# Patient Record
Sex: FEMALE | Race: WHITE | URBAN - METROPOLITAN AREA
[De-identification: names, ages, dates, MRNs, and addresses within clinical notes are randomized per-mention and may not be internally consistent; named-entity substitution may affect disease eponyms.]

---

## 2019-06-19 ENCOUNTER — Encounter (OUTPATIENT)
Dept: URBAN - METROPOLITAN AREA CLINIC 71 | Facility: CLINIC | Age: 79
End: 2019-06-19
Payer: MEDICARE

## 2019-06-19 PROCEDURE — 99214 OFFICE O/P EST MOD 30 MIN: CPT | Performed by: OPHTHALMOLOGY

## 2019-06-19 PROCEDURE — 92250 FUNDUS PHOTOGRAPHY W/I&R: CPT | Performed by: OPHTHALMOLOGY

## 2019-11-19 ENCOUNTER — Encounter (OUTPATIENT)
Dept: URBAN - METROPOLITAN AREA CLINIC 71 | Facility: CLINIC | Age: 79
End: 2019-11-19
Payer: MEDICARE

## 2019-11-19 PROCEDURE — 92014 COMPRE OPH EXAM EST PT 1/>: CPT | Performed by: OPTOMETRIST

## 2019-11-19 PROCEDURE — 92134 CPTRZ OPH DX IMG PST SGM RTA: CPT | Performed by: OPTOMETRIST

## 2020-11-25 ENCOUNTER — OFFICE VISIT (OUTPATIENT)
Dept: URBAN - METROPOLITAN AREA CLINIC 71 | Facility: CLINIC | Age: 80
End: 2020-11-25
Payer: MEDICARE

## 2020-11-25 PROCEDURE — 92014 COMPRE OPH EXAM EST PT 1/>: CPT | Performed by: OPHTHALMOLOGY

## 2020-11-25 ASSESSMENT — INTRAOCULAR PRESSURE
OS: 16
OD: 15

## 2020-11-25 ASSESSMENT — KERATOMETRY
OS: 43.88
OD: 44.13

## 2020-11-25 NOTE — IMPRESSION/PLAN
Impression: Combined forms of age-related cataract, bilateral: H25.813. Plan: Discussed diagnosis in detail with patient. Discussed treatment options with patient. Discussed risks and benefits and patient understands. Discussed this being the time to get them removed- pt would like to wait till covid is over.

## 2021-05-03 ENCOUNTER — OFFICE VISIT (OUTPATIENT)
Dept: URBAN - METROPOLITAN AREA CLINIC 71 | Facility: CLINIC | Age: 81
End: 2021-05-03
Payer: MEDICARE

## 2021-05-03 PROCEDURE — 99213 OFFICE O/P EST LOW 20 MIN: CPT | Performed by: OPHTHALMOLOGY

## 2021-05-03 ASSESSMENT — INTRAOCULAR PRESSURE
OD: 14
OS: 16

## 2021-06-08 ENCOUNTER — OFFICE VISIT (OUTPATIENT)
Dept: URBAN - METROPOLITAN AREA CLINIC 71 | Facility: CLINIC | Age: 81
End: 2021-06-08
Payer: MEDICARE

## 2021-06-08 DIAGNOSIS — H11.31 CONJUNCTIVAL HEMORRHAGE, RIGHT EYE: Primary | ICD-10-CM

## 2021-06-08 DIAGNOSIS — H25.813 COMBINED FORMS OF AGE-RELATED CATARACT, BILATERAL: ICD-10-CM

## 2021-06-08 PROCEDURE — 99212 OFFICE O/P EST SF 10 MIN: CPT | Performed by: OPHTHALMOLOGY

## 2021-06-08 ASSESSMENT — INTRAOCULAR PRESSURE
OD: 17
OS: 17

## 2021-06-08 NOTE — IMPRESSION/PLAN
Impression: Conjunctival hemorrhage, right eye: H11.31. Plan: Discussed DX with patient, explained that this will resolve its self.

## 2021-06-08 NOTE — IMPRESSION/PLAN
Impression: Combined forms of age-related cataract, bilateral: H25.813. Plan: Will re-evaluate in 6 mons.

## 2021-08-02 ENCOUNTER — OFFICE VISIT (OUTPATIENT)
Dept: URBAN - METROPOLITAN AREA CLINIC 71 | Facility: CLINIC | Age: 81
End: 2021-08-02
Payer: MEDICARE

## 2021-08-02 DIAGNOSIS — H43.813 VITREOUS DEGENERATION, BILATERAL: ICD-10-CM

## 2021-08-02 DIAGNOSIS — H04.123 DRY EYE SYNDROME OF BILATERAL LACRIMAL GLANDS: ICD-10-CM

## 2021-08-02 PROCEDURE — 92014 COMPRE OPH EXAM EST PT 1/>: CPT | Performed by: OPHTHALMOLOGY

## 2021-08-02 PROCEDURE — 92134 CPTRZ OPH DX IMG PST SGM RTA: CPT | Performed by: OPHTHALMOLOGY

## 2021-08-02 ASSESSMENT — INTRAOCULAR PRESSURE
OD: 15
OS: 15

## 2021-08-02 ASSESSMENT — KERATOMETRY
OD: 44.00
OS: 43.75

## 2021-08-02 ASSESSMENT — VISUAL ACUITY
OD: 20/20
OS: 20/20

## 2021-08-02 NOTE — IMPRESSION/PLAN
Impression: Combined forms of age-related cataract, bilateral: H25.813. Plan: Cataracts account for the patient's complaints. Discussed all risks, benefits, procedures and recovery. Patient understands changing glasses will not improve vision. Patient desires to have surgery, order for surgery.  

OS first

## 2022-01-24 ENCOUNTER — PRE-OPERATIVE VISIT (OUTPATIENT)
Dept: URBAN - METROPOLITAN AREA CLINIC 71 | Facility: CLINIC | Age: 82
End: 2022-01-24
Payer: MEDICARE

## 2022-01-24 DIAGNOSIS — H25.812 COMBINED FORMS OF AGE-RELATED CATARACT, LEFT EYE: ICD-10-CM

## 2022-01-24 PROCEDURE — 92012 INTRM OPH EXAM EST PATIENT: CPT | Performed by: OPHTHALMOLOGY

## 2022-01-24 PROCEDURE — 92136 OPHTHALMIC BIOMETRY: CPT | Performed by: OPHTHALMOLOGY

## 2022-01-24 RX ORDER — KETOROLAC TROMETHAMINE 5 MG/ML
0.5 % SOLUTION OPHTHALMIC
Qty: 5 | Refills: 3 | Status: ACTIVE
Start: 2022-01-24

## 2022-01-24 ASSESSMENT — PACHYMETRY
OD: 3.41
OS: 3.63
OD: 25.31
OS: 25.40

## 2022-01-24 ASSESSMENT — VISUAL ACUITY
OS: 20/20
OD: 20/20

## 2022-01-27 NOTE — IMPRESSION/PLAN
Impression: Combined forms of age-related cataract, bilateral: H25.813. Plan:  Discussed cataract diagnosis with the patient. Discussed and reviewed treatment options for cataracts. Surgical treatment is necessary to improve vision. Risks and benefits of surgical treatment were discussed and understood. Patient elects surgical treatment. Recommend surgery OU, OD first. Patient is candidate/interested in multifocal lens, toric lens/astigmatism correction, LenSx, ORA, Aim OD: -0.25. Aim OS: -0.25. Patient will need glasses for all near work, including computer. Outcome of surgery limitations: Residual Astigmatism. PROVIDER ORDERED [facility], [address] [id number] [address]. PROCEED WITH FOREVER YOUNG CATARACT SURGERY WITH ORA, OD FIRST, THEN OS, CLEARANCE FROM ONCOLOGIST OBTAINED, DEXYCU, KETOROLAC, CS DONE.

## 2022-02-01 ENCOUNTER — SURGERY (OUTPATIENT)
Dept: URBAN - METROPOLITAN AREA SURGERY 44 | Facility: SURGERY | Age: 82
End: 2022-02-01
Payer: MEDICARE

## 2022-02-01 ENCOUNTER — SURGERY (OUTPATIENT)
Dept: URBAN - METROPOLITAN AREA SURGERY 45 | Facility: SURGERY | Age: 82
End: 2022-02-01
Payer: MEDICARE

## 2022-02-01 PROCEDURE — PR1FY PR1FY: CUSTOM | Performed by: OPHTHALMOLOGY

## 2022-02-01 PROCEDURE — 66984 XCAPSL CTRC RMVL W/O ECP: CPT | Performed by: OPHTHALMOLOGY

## 2022-02-02 ENCOUNTER — POST-OPERATIVE VISIT (OUTPATIENT)
Dept: URBAN - METROPOLITAN AREA CLINIC 71 | Facility: CLINIC | Age: 82
End: 2022-02-02
Payer: MEDICARE

## 2022-02-02 DIAGNOSIS — Z48.810 ENCOUNTER FOR SURGICAL AFTERCARE FOLLOWING SURGERY ON A SENSE ORGAN: Primary | ICD-10-CM

## 2022-02-02 PROCEDURE — 99024 POSTOP FOLLOW-UP VISIT: CPT | Performed by: OPHTHALMOLOGY

## 2022-02-02 ASSESSMENT — INTRAOCULAR PRESSURE
OD: 6
OS: 9

## 2022-02-02 NOTE — IMPRESSION/PLAN
Impression: S/P Cataract Extraction by phacoemulsification with IOL placement; Femto (LenSx); LRI (Limbal Relaxing Incision); ORA OD - 1 Day. Wound secure. IOL well centered. IOP is stable. Plan: Patient is cleared to proceed as scheduled for second eye cataract surgery.

## 2022-04-26 ENCOUNTER — SURGERY (OUTPATIENT)
Dept: URBAN - METROPOLITAN AREA SURGERY 45 | Facility: SURGERY | Age: 82
End: 2022-04-26
Payer: MEDICARE

## 2022-04-26 ENCOUNTER — SURGERY (OUTPATIENT)
Dept: URBAN - METROPOLITAN AREA SURGERY 44 | Facility: SURGERY | Age: 82
End: 2022-04-26
Payer: MEDICARE

## 2022-04-26 PROCEDURE — 66984 XCAPSL CTRC RMVL W/O ECP: CPT | Performed by: OPHTHALMOLOGY

## 2022-04-26 PROCEDURE — PR1FY PR1FY: CUSTOM | Performed by: OPHTHALMOLOGY

## 2022-04-27 ENCOUNTER — POST-OPERATIVE VISIT (OUTPATIENT)
Dept: URBAN - METROPOLITAN AREA CLINIC 71 | Facility: CLINIC | Age: 82
End: 2022-04-27
Payer: MEDICARE

## 2022-04-27 PROCEDURE — 99024 POSTOP FOLLOW-UP VISIT: CPT | Performed by: OPHTHALMOLOGY

## 2022-04-27 ASSESSMENT — INTRAOCULAR PRESSURE
OD: 10
OS: 21

## 2022-04-27 NOTE — IMPRESSION/PLAN
Impression: S/P Cataract Extraction by phacoemulsification with IOL placement; ORA; Femto (LenSx); LRI (Limbal Relaxing Incision) OS - 1 Day. Presence of intraocular lens  Z96.1. Plan: IOL in place, helaing well. Continue ketorolac BID OS for 3 weeks. 
Will have pt f/u with an optom for the PO REF

## 2022-05-25 ENCOUNTER — POST-OPERATIVE VISIT (OUTPATIENT)
Dept: URBAN - METROPOLITAN AREA CLINIC 71 | Facility: CLINIC | Age: 82
End: 2022-05-25
Payer: MEDICARE

## 2022-05-25 DIAGNOSIS — Z96.1 PRESENCE OF INTRAOCULAR LENS: Primary | ICD-10-CM

## 2022-05-25 DIAGNOSIS — H52.4 PRESBYOPIA: ICD-10-CM

## 2022-05-25 PROCEDURE — 99024 POSTOP FOLLOW-UP VISIT: CPT | Performed by: OPTOMETRIST

## 2022-05-25 ASSESSMENT — INTRAOCULAR PRESSURE
OD: 16
OS: 12

## 2022-05-25 ASSESSMENT — VISUAL ACUITY
OD: 20/25
OS: 20/20

## 2022-05-25 NOTE — IMPRESSION/PLAN
Impression: S/P Cataract Extraction by phacoemulsification with IOL placement; ORA; Femto (LenSx); LRI (Limbal Relaxing Incision) OS - 29 Days. Presence of intraocular lens  Z96.1. Excellent post op course Plan: IOL stable and in place OU. New glasses RX given today for progressives. Discussed changes in the prescription. Recommend Full time wear for her best vision but its not required. OK to use OTC readers +2.50. Will R/C the capsules in 6 months.

## 2022-12-13 ENCOUNTER — OFFICE VISIT (OUTPATIENT)
Dept: URBAN - METROPOLITAN AREA CLINIC 71 | Facility: CLINIC | Age: 82
End: 2022-12-13
Payer: MEDICARE

## 2022-12-13 DIAGNOSIS — H43.813 VITREOUS DEGENERATION, BILATERAL: Primary | ICD-10-CM

## 2022-12-13 DIAGNOSIS — Z96.1 PRESENCE OF INTRAOCULAR LENS: ICD-10-CM

## 2022-12-13 PROCEDURE — 99214 OFFICE O/P EST MOD 30 MIN: CPT | Performed by: OPTOMETRIST

## 2022-12-13 ASSESSMENT — INTRAOCULAR PRESSURE
OD: 11
OS: 12

## 2024-01-11 ENCOUNTER — OFFICE VISIT (OUTPATIENT)
Dept: URBAN - METROPOLITAN AREA CLINIC 71 | Facility: CLINIC | Age: 84
End: 2024-01-11
Payer: MEDICARE

## 2024-01-11 DIAGNOSIS — H35.033 HYPERTENSIVE RETINOPATHY, BILATERAL: ICD-10-CM

## 2024-01-11 DIAGNOSIS — H43.813 VITREOUS DEGENERATION, BILATERAL: Primary | ICD-10-CM

## 2024-01-11 DIAGNOSIS — H26.492 OTHER SECONDARY CATARACT, LEFT EYE: ICD-10-CM

## 2024-01-11 PROCEDURE — 99213 OFFICE O/P EST LOW 20 MIN: CPT | Performed by: OPTOMETRIST

## 2024-01-11 ASSESSMENT — INTRAOCULAR PRESSURE
OS: 11
OD: 9

## 2025-01-15 ENCOUNTER — OFFICE VISIT (OUTPATIENT)
Dept: URBAN - METROPOLITAN AREA CLINIC 71 | Facility: CLINIC | Age: 85
End: 2025-01-15
Payer: MEDICARE

## 2025-01-15 DIAGNOSIS — Z96.1 PRESENCE OF INTRAOCULAR LENS: ICD-10-CM

## 2025-01-15 DIAGNOSIS — H04.123 DRY EYE SYNDROME OF BILATERAL LACRIMAL GLANDS: ICD-10-CM

## 2025-01-15 DIAGNOSIS — H43.813 VITREOUS DEGENERATION, BILATERAL: Primary | ICD-10-CM

## 2025-01-15 DIAGNOSIS — H35.033 HYPERTENSIVE RETINOPATHY, BILATERAL: ICD-10-CM

## 2025-01-15 DIAGNOSIS — H35.419 LATTICE DEGENERATION OF RETINA: ICD-10-CM

## 2025-01-15 DIAGNOSIS — H26.493 OTHER SECONDARY CATARACT, BILATERAL: ICD-10-CM

## 2025-01-15 PROCEDURE — 99213 OFFICE O/P EST LOW 20 MIN: CPT

## 2025-01-15 ASSESSMENT — INTRAOCULAR PRESSURE
OD: 9
OS: 12